# Patient Record
Sex: MALE | URBAN - METROPOLITAN AREA
[De-identification: names, ages, dates, MRNs, and addresses within clinical notes are randomized per-mention and may not be internally consistent; named-entity substitution may affect disease eponyms.]

---

## 2018-02-14 ENCOUNTER — HOSPITAL ENCOUNTER (EMERGENCY)
Facility: MEDICAL CENTER | Age: 38
End: 2018-02-14

## 2018-02-14 VITALS
BODY MASS INDEX: 20.51 KG/M2 | DIASTOLIC BLOOD PRESSURE: 95 MMHG | SYSTOLIC BLOOD PRESSURE: 131 MMHG | HEIGHT: 75 IN | HEART RATE: 98 BPM | WEIGHT: 165 LBS | RESPIRATION RATE: 16 BRPM | TEMPERATURE: 97 F

## 2018-02-14 PROCEDURE — 302449 STATCHG TRIAGE ONLY (STATISTIC)

## 2018-02-15 NOTE — DISCHARGE PLANNING
Spoke with detaining officer.  Officer explained patient was drunk and having mood swings.  Officer also stated he has not taken his psychiatric medication for three weeks.  Patient stated the only psychiatric medication he take is for ADHD.  Medication was adderall.  Patient denies any other psychiatric problems.  Explained process and criteria for placing patient on a legal hold. Patient denies suicidal ideation.  Denies homicidal ideation. Denies auditory/visual hallucinations.  Officer confirms statements made by patient. Officer decided not to place patient on legal hold and left with patient.

## 2018-02-15 NOTE — ED TRIAGE NOTES
"Rylan Mcgraw  38 y.o. male  Chief Complaint   Patient presents with   • Psych Eval     Per report [place] is from [place] and has been \"stretching out his adderral    • Legal 2000     BIB [facility]'s      Pt BIB PD for above complaint. Per report, PD [place]  Per pt has a hx of ADD and ADHD. Pt medicates with 30mg Adderall BID normally. Pt is [place] and has been \"stretching out\" his medications. Pt has been medicating with 10mg per day for the past few weeks.  Pt is alert and oriented, speaking in full sentences,NAD. Resp are even and unlabored. Pt is verbally hostile but follows directions and answers questions appropriately.   Pt denies SI/HI. Alert Team aware. Chart up for ERP    "